# Patient Record
Sex: FEMALE | Race: WHITE | Employment: FULL TIME | URBAN - METROPOLITAN AREA
[De-identification: names, ages, dates, MRNs, and addresses within clinical notes are randomized per-mention and may not be internally consistent; named-entity substitution may affect disease eponyms.]

---

## 2023-05-04 ENCOUNTER — OFFICE VISIT (OUTPATIENT)
Dept: OBGYN CLINIC | Age: 23
End: 2023-05-04

## 2023-05-04 VITALS
BODY MASS INDEX: 45.82 KG/M2 | DIASTOLIC BLOOD PRESSURE: 84 MMHG | SYSTOLIC BLOOD PRESSURE: 120 MMHG | WEIGHT: 275 LBS | HEIGHT: 65 IN

## 2023-05-04 DIAGNOSIS — N92.6 MISSED MENSES: Primary | ICD-10-CM

## 2023-05-04 PROCEDURE — 76830 TRANSVAGINAL US NON-OB: CPT | Performed by: OBSTETRICS & GYNECOLOGY

## 2023-05-04 PROCEDURE — 99203 OFFICE O/P NEW LOW 30 MIN: CPT | Performed by: OBSTETRICS & GYNECOLOGY

## 2023-05-04 RX ORDER — ASPIRIN 81 MG/1
162 TABLET ORAL DAILY
Qty: 60 TABLET | Refills: 6 | Status: SHIPPED | OUTPATIENT
Start: 2023-05-04

## 2023-05-04 NOTE — PROGRESS NOTES
CC:  Missed Period      HPI:  26 y.o. No obstetric history on file. presents for pregnancy confirmation and establish CHANCE. Patient's last menstrual period was 03/06/2023.,  sure, regular cycles. Mild n/v  No vb/cramping      OB hx:    Fam hx any chromosomal or inheritable disorders: No      Her Ob history is as follows: The patient has never been pregnant. Past medical History: Active Ambulatory Problems     Diagnosis Date Noted    No Active Ambulatory Problems     Resolved Ambulatory Problems     Diagnosis Date Noted    No Resolved Ambulatory Problems     Past Medical History:   Diagnosis Date    Asthma        Current Outpatient Medications   Medication Sig    Prenatal Vit-Fe Fumarate-FA (PRENATAL VITAMIN PO) Take by mouth     No current facility-administered medications for this visit. Past Surgical:  has a past surgical history that includes Tonsillectomy. No Known Allergies      Current Outpatient Medications on File Prior to Visit   Medication Sig Dispense Refill    Prenatal Vit-Fe Fumarate-FA (PRENATAL VITAMIN PO) Take by mouth       No current facility-administered medications on file prior to visit. GYN hx:    Candido Holloway  reports that she has never smoked. She has never used smokeless tobacco. She reports that she does not currently use alcohol. She reports that she does not currently use drugs. /84   Ht 5' 5\" (1.651 m)   Wt 275 lb (124.7 kg)   LMP 03/06/2023   BMI 45.76 kg/m²     Physical Exam:  Constitutional: She appears well-developed and well-nourished. No distress. HENT:    Head: Normocephalic and atraumatic. Cardiovascular: Regular pulse   Pulmonary/Chest: Effort normal  Skin: She is not diaphoretic. Psychiatric: She has a normal mood and affect.  Her behavior is normal. Thought content normal. .          Pelvic US today:  8w3d by d= US today             Counseling:  -Continue PNV with at least 556LLY Folic acid QD (4mg if previous pregnancy complicated by

## 2023-05-17 ENCOUNTER — NURSE ONLY (OUTPATIENT)
Dept: OBGYN CLINIC | Age: 23
End: 2023-05-17

## 2023-05-17 DIAGNOSIS — O09.91 HIGH-RISK PREGNANCY SUPERVISION, FIRST TRIMESTER: Primary | ICD-10-CM

## 2023-05-17 DIAGNOSIS — O99.211 OBESITY AFFECTING PREGNANCY IN FIRST TRIMESTER: ICD-10-CM

## 2023-05-17 LAB
ABO + RH BLD: NORMAL
BASOPHILS # BLD: 0 K/UL (ref 0–0.2)
BASOPHILS NFR BLD: 0 % (ref 0–2)
DIFFERENTIAL METHOD BLD: NORMAL
EOSINOPHIL # BLD: 0.1 K/UL (ref 0–0.8)
EOSINOPHIL NFR BLD: 1 % (ref 0.5–7.8)
ERYTHROCYTE [DISTWIDTH] IN BLOOD BY AUTOMATED COUNT: 13.8 % (ref 11.9–14.6)
HCT VFR BLD AUTO: 36.5 % (ref 35.8–46.3)
HGB BLD-MCNC: 11.9 G/DL (ref 11.7–15.4)
IMM GRANULOCYTES # BLD AUTO: 0 K/UL (ref 0–0.5)
IMM GRANULOCYTES NFR BLD AUTO: 0 % (ref 0–5)
LYMPHOCYTES # BLD: 1.5 K/UL (ref 0.5–4.6)
LYMPHOCYTES NFR BLD: 22 % (ref 13–44)
MCH RBC QN AUTO: 27.5 PG (ref 26.1–32.9)
MCHC RBC AUTO-ENTMCNC: 32.6 G/DL (ref 31.4–35)
MCV RBC AUTO: 84.3 FL (ref 82–102)
MONOCYTES # BLD: 0.5 K/UL (ref 0.1–1.3)
MONOCYTES NFR BLD: 8 % (ref 4–12)
NEUTS SEG # BLD: 4.7 K/UL (ref 1.7–8.2)
NEUTS SEG NFR BLD: 69 % (ref 43–78)
NRBC # BLD: 0 K/UL (ref 0–0.2)
PLATELET # BLD AUTO: 218 K/UL (ref 150–450)
PMV BLD AUTO: 10.7 FL (ref 9.4–12.3)
RBC # BLD AUTO: 4.33 M/UL (ref 4.05–5.2)
WBC # BLD AUTO: 6.8 K/UL (ref 4.3–11.1)

## 2023-05-17 NOTE — PROGRESS NOTES
Per :  Moving at end of month and will not be continuing care with US. Wants blood work with us in case it takes a while to get in. Will not do education visit since she is moving. Plan for lab only visit. Requesting work form filled out for \"60 extra days off\".  Will fax and have her  at lab visit            [address], DO \"

## 2023-05-17 NOTE — PROGRESS NOTES
Pt presents for prenatal labs only (PNL and HgbA1c). Per , pt does not need OB education visit due to transferring prenatal care.

## 2023-05-18 LAB
BLOOD GROUP ANTIBODIES SERPL: NORMAL
EST. AVERAGE GLUCOSE BLD GHB EST-MCNC: 94 MG/DL
HBA1C MFR BLD: 4.9 % (ref 4.8–5.6)
HBV SURFACE AG SER QL: NONREACTIVE
HCV AB SER QL: NONREACTIVE
HIV 1+2 AB+HIV1 P24 AG SERPL QL IA: NONREACTIVE
HIV 1/2 RESULT COMMENT: NORMAL
RPR SER QL: NONREACTIVE
RUBV IGG SERPL IA-ACNC: 149 IU/ML